# Patient Record
Sex: MALE | Race: WHITE
[De-identification: names, ages, dates, MRNs, and addresses within clinical notes are randomized per-mention and may not be internally consistent; named-entity substitution may affect disease eponyms.]

---

## 2017-03-02 ENCOUNTER — HOSPITAL ENCOUNTER (OUTPATIENT)
Dept: HOSPITAL 39 - YCFC.O | Age: 42
Discharge: HOME | End: 2017-03-02
Attending: NURSE PRACTITIONER
Payer: COMMERCIAL

## 2017-03-02 DIAGNOSIS — R73.09: ICD-10-CM

## 2017-03-02 DIAGNOSIS — R03.0: ICD-10-CM

## 2017-03-02 DIAGNOSIS — R05: ICD-10-CM

## 2017-03-02 DIAGNOSIS — G40.909: Primary | ICD-10-CM

## 2017-04-11 ENCOUNTER — HOSPITAL ENCOUNTER (OUTPATIENT)
Dept: HOSPITAL 39 - YCFC.O | Age: 42
Discharge: HOME | End: 2017-04-11
Attending: NURSE PRACTITIONER
Payer: COMMERCIAL

## 2017-04-11 DIAGNOSIS — R73.09: ICD-10-CM

## 2017-04-11 DIAGNOSIS — R05: ICD-10-CM

## 2017-04-11 DIAGNOSIS — R03.0: ICD-10-CM

## 2017-04-11 DIAGNOSIS — G40.909: Primary | ICD-10-CM

## 2017-05-16 ENCOUNTER — HOSPITAL ENCOUNTER (OUTPATIENT)
Dept: HOSPITAL 39 - YCFC.O | Age: 42
Discharge: HOME | End: 2017-05-16
Attending: NURSE PRACTITIONER
Payer: COMMERCIAL

## 2017-05-16 DIAGNOSIS — G40.909: Primary | ICD-10-CM

## 2017-06-06 ENCOUNTER — HOSPITAL ENCOUNTER (OUTPATIENT)
Dept: HOSPITAL 39 - YCFC.O | Age: 42
Discharge: HOME | End: 2017-06-06
Attending: NURSE PRACTITIONER
Payer: COMMERCIAL

## 2017-06-06 DIAGNOSIS — G40.909: Primary | ICD-10-CM

## 2017-06-09 ENCOUNTER — HOSPITAL ENCOUNTER (OUTPATIENT)
Dept: HOSPITAL 39 - YCFC.O | Age: 42
End: 2017-06-09
Attending: NURSE PRACTITIONER
Payer: COMMERCIAL

## 2017-06-09 DIAGNOSIS — R05: Primary | ICD-10-CM

## 2017-06-09 DIAGNOSIS — F17.290: ICD-10-CM

## 2017-06-12 NOTE — RAD
EXAM DESCRIPTION:



Chest,2 Views



CLINICAL HISTORY:



COUGH, SMOKER. F17.290   [id number]



COMPARISON:

None



TECHNIQUE:



PA/lateral



FINDINGS:



Slightly coarse markings consistent with an element of COPD or

bronchitis is evident without consolidation. Right costophrenic

angle was not imaged despite a repeat PA examination. No definite

infiltrate or mass is seen.

The heart is normal in size and shape with no evidence of

vascular congestion.

The carrol and mediastinum demonstrate normal contours.

The bony spine and chest wall is normal for age in appearance.



IMPRESSION:



No acute cardiopulmonary disease.







 



Electronically signed by:  Abel Chaudhary MD  6/12/2017 4:32 PM

[facility] Workstation: [id number]

## 2018-06-07 ENCOUNTER — HOSPITAL ENCOUNTER (OUTPATIENT)
Dept: HOSPITAL 39 - YCFC.O | Age: 43
End: 2018-06-07
Attending: NURSE PRACTITIONER
Payer: COMMERCIAL

## 2018-06-07 DIAGNOSIS — G40.909: Primary | ICD-10-CM

## 2018-12-28 ENCOUNTER — HOSPITAL ENCOUNTER (OUTPATIENT)
Dept: HOSPITAL 39 - LAB.O | Age: 43
End: 2018-12-28
Attending: NURSE PRACTITIONER
Payer: COMMERCIAL

## 2018-12-28 DIAGNOSIS — G40.909: Primary | ICD-10-CM

## 2019-01-25 ENCOUNTER — HOSPITAL ENCOUNTER (OUTPATIENT)
Dept: HOSPITAL 39 - LAB.O | Age: 44
End: 2019-01-25
Attending: NURSE PRACTITIONER
Payer: COMMERCIAL

## 2019-01-25 DIAGNOSIS — G40.909: Primary | ICD-10-CM

## 2020-02-18 ENCOUNTER — HOSPITAL ENCOUNTER (OUTPATIENT)
Dept: HOSPITAL 39 - LAB.O | Age: 45
End: 2020-02-18
Attending: FAMILY MEDICINE
Payer: COMMERCIAL

## 2020-02-18 DIAGNOSIS — Z13.220: ICD-10-CM

## 2020-02-18 DIAGNOSIS — R53.83: ICD-10-CM

## 2020-02-18 DIAGNOSIS — G40.909: ICD-10-CM

## 2020-02-18 DIAGNOSIS — Z00.01: Primary | ICD-10-CM
